# Patient Record
Sex: FEMALE | Race: WHITE
[De-identification: names, ages, dates, MRNs, and addresses within clinical notes are randomized per-mention and may not be internally consistent; named-entity substitution may affect disease eponyms.]

---

## 2017-03-09 ENCOUNTER — HOSPITAL ENCOUNTER (OUTPATIENT)
Dept: HOSPITAL 80 - BRMIMAGING | Age: 77
End: 2017-03-09
Attending: INTERNAL MEDICINE
Payer: COMMERCIAL

## 2017-03-09 DIAGNOSIS — M85.80: ICD-10-CM

## 2017-03-09 DIAGNOSIS — Z13.820: Primary | ICD-10-CM

## 2017-03-09 DIAGNOSIS — E03.9: ICD-10-CM

## 2017-03-09 DIAGNOSIS — Z79.52: ICD-10-CM

## 2017-03-09 DIAGNOSIS — Z78.0: ICD-10-CM

## 2017-12-05 ENCOUNTER — HOSPITAL ENCOUNTER (OUTPATIENT)
Dept: HOSPITAL 80 - CIMAGING | Age: 77
End: 2017-12-05
Attending: INTERNAL MEDICINE
Payer: COMMERCIAL

## 2017-12-05 DIAGNOSIS — M86.172: Primary | ICD-10-CM

## 2017-12-05 DIAGNOSIS — M19.072: ICD-10-CM

## 2017-12-31 ENCOUNTER — HOSPITAL ENCOUNTER (OUTPATIENT)
Dept: HOSPITAL 80 - FIMAGING | Age: 77
End: 2017-12-31
Attending: INTERNAL MEDICINE
Payer: COMMERCIAL

## 2017-12-31 DIAGNOSIS — Z98.890: Primary | ICD-10-CM

## 2017-12-31 DIAGNOSIS — M60.9: ICD-10-CM

## 2018-01-10 ENCOUNTER — HOSPITAL ENCOUNTER (OUTPATIENT)
Dept: HOSPITAL 80 - FIMAGING | Age: 78
End: 2018-01-10
Attending: INTERNAL MEDICINE
Payer: COMMERCIAL

## 2018-01-10 DIAGNOSIS — R93.7: ICD-10-CM

## 2018-01-10 DIAGNOSIS — M53.3: Primary | ICD-10-CM

## 2018-01-10 DIAGNOSIS — Z96.641: ICD-10-CM

## 2018-01-10 DIAGNOSIS — M54.5: ICD-10-CM

## 2018-01-10 PROCEDURE — 78315 BONE IMAGING 3 PHASE: CPT

## 2018-01-10 PROCEDURE — A9503 TC99M MEDRONATE: HCPCS

## 2018-01-17 ENCOUNTER — HOSPITAL ENCOUNTER (OUTPATIENT)
Dept: HOSPITAL 80 - FIMAGING | Age: 78
Discharge: HOME | End: 2018-01-17
Attending: INTERNAL MEDICINE
Payer: COMMERCIAL

## 2018-01-17 VITALS — DIASTOLIC BLOOD PRESSURE: 91 MMHG | RESPIRATION RATE: 16 BRPM | SYSTOLIC BLOOD PRESSURE: 122 MMHG

## 2018-01-17 VITALS — HEART RATE: 102 BPM

## 2018-01-17 VITALS — OXYGEN SATURATION: 90 % | TEMPERATURE: 97.3 F

## 2018-01-17 DIAGNOSIS — M53.3: ICD-10-CM

## 2018-01-17 DIAGNOSIS — S32.10XD: ICD-10-CM

## 2018-01-17 DIAGNOSIS — M54.5: Primary | ICD-10-CM

## 2018-01-17 NOTE — PDGENHP
History & Physical


Chief Complaint: LOW BACK PAIN, L>R, GOING DOWN BACK OF THIGH


History of Present Illness: DID WELL POST SACROPLASTY 2016.  RECURRENT LOWER 

BACK DIFFERENT FROM PREVIOUS PAIN.


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 

WITH DAILY FUNCTION.  NO MEDICAL NEED FOR HOME HEALTH.


Relevant Physical Exam: 87% ON RA.  OTHERWISE A/O X 3.  IN NO DISTRESS.


Cardiorespiratory Assessment: RRR.  CTA

## 2018-01-17 NOTE — PDRADPN
Radiology Procedure Note


Date of Procedure: 01/17/18


Radiologist: Mirian Arreguin


Anesthesia: IV Sedation (fentanyl and versed)


Pre-op Diagnosis: back pain


Post-op Diagnosis: same


Indication: worsening back pain, left side


Procedure: LT L5-S1 facet injection


Finding(s): see report


Inf/Abcess present in the surg proc area at time of surgery?: No


Complications: 





none

## 2018-01-17 NOTE — PDPROPOC
Sedation Plan of Care


Sedation Plan of Care: vital signs stable, mental status noted, patient 

educated of risks, benefits, alternatives, patient can tolerate sedation


ASA Classification: ASA 3


Planned drugs: fentanyl, midazolam


Mallampati Score: Class 2


Mallampati Reference Image: 





Patient passed 3-3-2 rule?: Yes

## 2018-10-29 ENCOUNTER — HOSPITAL ENCOUNTER (OUTPATIENT)
Dept: HOSPITAL 80 - CIMAGING | Age: 78
End: 2018-10-29
Attending: INTERNAL MEDICINE
Payer: COMMERCIAL

## 2018-10-29 DIAGNOSIS — Z87.39: ICD-10-CM

## 2018-10-29 DIAGNOSIS — M79.89: Primary | ICD-10-CM
